# Patient Record
Sex: FEMALE | Race: WHITE | NOT HISPANIC OR LATINO
[De-identification: names, ages, dates, MRNs, and addresses within clinical notes are randomized per-mention and may not be internally consistent; named-entity substitution may affect disease eponyms.]

---

## 2017-09-10 ENCOUNTER — HEALTH MAINTENANCE LETTER (OUTPATIENT)
Age: 32
End: 2017-09-10

## 2017-09-14 ENCOUNTER — COMMUNICATION - HEALTHEAST (OUTPATIENT)
Dept: FAMILY MEDICINE | Facility: CLINIC | Age: 32
End: 2017-09-14

## 2017-09-22 ENCOUNTER — HOSPITAL ENCOUNTER (OUTPATIENT)
Dept: ULTRASOUND IMAGING | Facility: HOSPITAL | Age: 32
Discharge: HOME | End: 2017-09-22
Attending: ADVANCED PRACTICE MIDWIFE

## 2017-09-22 ENCOUNTER — PRENATAL OFFICE VISIT - HEALTHEAST (OUTPATIENT)
Dept: MIDWIFE SERVICES | Facility: CLINIC | Age: 32
End: 2017-09-22

## 2017-09-22 ENCOUNTER — AMBULATORY - HEALTHEAST (OUTPATIENT)
Dept: OBGYN | Facility: CLINIC | Age: 32
End: 2017-09-22

## 2017-09-22 DIAGNOSIS — Z86.59 HISTORY OF POSTPARTUM DEPRESSION, CURRENTLY PREGNANT: ICD-10-CM

## 2017-09-22 DIAGNOSIS — Z34.81 ENCOUNTER FOR SUPERVISION OF OTHER NORMAL PREGNANCY, FIRST TRIMESTER: ICD-10-CM

## 2017-09-22 DIAGNOSIS — O99.891 HISTORY OF POSTPARTUM DEPRESSION, CURRENTLY PREGNANT: ICD-10-CM

## 2017-09-22 ASSESSMENT — MIFFLIN-ST. JEOR: SCORE: 1317.52

## 2017-09-29 ENCOUNTER — AMBULATORY - HEALTHEAST (OUTPATIENT)
Dept: LAB | Facility: CLINIC | Age: 32
End: 2017-09-29

## 2017-09-29 DIAGNOSIS — Z34.81 ENCOUNTER FOR SUPERVISION OF OTHER NORMAL PREGNANCY, FIRST TRIMESTER: ICD-10-CM

## 2017-09-29 LAB — HIV 1+2 AB+HIV1 P24 AG SERPL QL IA: NEGATIVE

## 2017-09-30 LAB — HBV SURFACE AG SERPL QL IA: NEGATIVE

## 2017-10-02 LAB — SYPHILIS RPR SCREEN - HISTORICAL: NORMAL

## 2017-10-30 ENCOUNTER — PRENATAL OFFICE VISIT - HEALTHEAST (OUTPATIENT)
Dept: MIDWIFE SERVICES | Facility: CLINIC | Age: 32
End: 2017-10-30

## 2017-10-30 DIAGNOSIS — Z34.82 ENCOUNTER FOR SUPERVISION OF OTHER NORMAL PREGNANCY IN SECOND TRIMESTER: ICD-10-CM

## 2017-10-30 DIAGNOSIS — Z00.00 HEALTH CARE MAINTENANCE: ICD-10-CM

## 2017-10-30 ASSESSMENT — MIFFLIN-ST. JEOR: SCORE: 1345.19

## 2017-11-01 ENCOUNTER — COMMUNICATION - HEALTHEAST (OUTPATIENT)
Dept: OBGYN | Facility: CLINIC | Age: 32
End: 2017-11-01

## 2017-11-27 ENCOUNTER — HOSPITAL ENCOUNTER (OUTPATIENT)
Dept: ULTRASOUND IMAGING | Facility: HOSPITAL | Age: 32
Discharge: HOME | End: 2017-11-27
Attending: ADVANCED PRACTICE MIDWIFE

## 2017-11-27 DIAGNOSIS — Z34.82 ENCOUNTER FOR SUPERVISION OF OTHER NORMAL PREGNANCY IN SECOND TRIMESTER: ICD-10-CM

## 2017-11-28 ENCOUNTER — AMBULATORY - HEALTHEAST (OUTPATIENT)
Dept: MIDWIFE SERVICES | Facility: CLINIC | Age: 32
End: 2017-11-28

## 2017-11-30 ENCOUNTER — PRENATAL OFFICE VISIT - HEALTHEAST (OUTPATIENT)
Dept: MIDWIFE SERVICES | Facility: CLINIC | Age: 32
End: 2017-11-30

## 2017-11-30 DIAGNOSIS — Z34.82 ENCOUNTER FOR SUPERVISION OF OTHER NORMAL PREGNANCY IN SECOND TRIMESTER: ICD-10-CM

## 2017-11-30 ASSESSMENT — MIFFLIN-ST. JEOR: SCORE: 1377.85

## 2017-12-11 ENCOUNTER — AMBULATORY - HEALTHEAST (OUTPATIENT)
Dept: OBGYN | Facility: CLINIC | Age: 32
End: 2017-12-11

## 2017-12-11 ENCOUNTER — COMMUNICATION - HEALTHEAST (OUTPATIENT)
Dept: ADMINISTRATIVE | Facility: CLINIC | Age: 32
End: 2017-12-11

## 2017-12-11 DIAGNOSIS — M54.2 NECK PAIN: ICD-10-CM

## 2017-12-13 ENCOUNTER — RECORDS - HEALTHEAST (OUTPATIENT)
Dept: ADMINISTRATIVE | Facility: OTHER | Age: 32
End: 2017-12-13

## 2017-12-13 ENCOUNTER — COMMUNICATION - HEALTHEAST (OUTPATIENT)
Dept: OBGYN | Facility: CLINIC | Age: 32
End: 2017-12-13

## 2017-12-28 ENCOUNTER — PRENATAL OFFICE VISIT - HEALTHEAST (OUTPATIENT)
Dept: MIDWIFE SERVICES | Facility: CLINIC | Age: 32
End: 2017-12-28

## 2017-12-28 DIAGNOSIS — Z34.82 ENCOUNTER FOR SUPERVISION OF OTHER NORMAL PREGNANCY IN SECOND TRIMESTER: ICD-10-CM

## 2017-12-28 ASSESSMENT — MIFFLIN-ST. JEOR: SCORE: 1391

## 2018-01-18 ENCOUNTER — PRENATAL OFFICE VISIT - HEALTHEAST (OUTPATIENT)
Dept: MIDWIFE SERVICES | Facility: CLINIC | Age: 33
End: 2018-01-18

## 2018-01-18 DIAGNOSIS — R73.09 ELEVATED GLUCOSE: ICD-10-CM

## 2018-01-18 DIAGNOSIS — Z34.83 ENCOUNTER FOR SUPERVISION OF OTHER NORMAL PREGNANCY IN THIRD TRIMESTER: ICD-10-CM

## 2018-01-18 LAB
FASTING STATUS PATIENT QL REPORTED: NO
GLUCOSE 1H P 50 G GLC PO SERPL-MCNC: 139 MG/DL (ref 70–139)
HGB BLD-MCNC: 11.6 G/DL (ref 12–16)

## 2018-01-18 ASSESSMENT — MIFFLIN-ST. JEOR: SCORE: 1417.76

## 2018-01-19 LAB — SYPHILIS RPR SCREEN - HISTORICAL: NORMAL

## 2018-01-25 ENCOUNTER — AMBULATORY - HEALTHEAST (OUTPATIENT)
Dept: MIDWIFE SERVICES | Facility: CLINIC | Age: 33
End: 2018-01-25

## 2018-01-25 DIAGNOSIS — R73.09 ELEVATED GLUCOSE: ICD-10-CM

## 2018-01-26 ENCOUNTER — AMBULATORY - HEALTHEAST (OUTPATIENT)
Dept: LAB | Facility: CLINIC | Age: 33
End: 2018-01-26

## 2018-01-26 DIAGNOSIS — R73.09 ELEVATED GLUCOSE: ICD-10-CM

## 2018-01-26 LAB
FASTING STATUS PATIENT QL REPORTED: YES
GLUCOSE 1H P 100 G GLC PO SERPL-MCNC: 107 MG/DL
GLUCOSE 2H P 100 G GLC PO SERPL-MCNC: 79 MG/DL
GLUCOSE 3H P 100 G GLC PO SERPL-MCNC: 81 MG/DL
GLUCOSE P FAST SERPL-MCNC: 72 MG/DL

## 2018-02-07 ENCOUNTER — COMMUNICATION - HEALTHEAST (OUTPATIENT)
Dept: MIDWIFE SERVICES | Facility: CLINIC | Age: 33
End: 2018-02-07

## 2018-02-15 ENCOUNTER — PRENATAL OFFICE VISIT - HEALTHEAST (OUTPATIENT)
Dept: MIDWIFE SERVICES | Facility: CLINIC | Age: 33
End: 2018-02-15

## 2018-02-15 DIAGNOSIS — K21.9 GASTROESOPHAGEAL REFLUX DISEASE WITHOUT ESOPHAGITIS: ICD-10-CM

## 2018-02-15 DIAGNOSIS — R82.90 FOUL SMELLING URINE: ICD-10-CM

## 2018-02-15 DIAGNOSIS — Z34.83 ENCOUNTER FOR SUPERVISION OF OTHER NORMAL PREGNANCY IN THIRD TRIMESTER: ICD-10-CM

## 2018-02-16 ENCOUNTER — COMMUNICATION - HEALTHEAST (OUTPATIENT)
Dept: MIDWIFE SERVICES | Facility: CLINIC | Age: 33
End: 2018-02-16

## 2018-02-19 ENCOUNTER — COMMUNICATION - HEALTHEAST (OUTPATIENT)
Dept: OBGYN | Facility: CLINIC | Age: 33
End: 2018-02-19

## 2018-02-28 ENCOUNTER — PRENATAL OFFICE VISIT - HEALTHEAST (OUTPATIENT)
Dept: MIDWIFE SERVICES | Facility: CLINIC | Age: 33
End: 2018-02-28

## 2018-02-28 DIAGNOSIS — Z13.31 POSITIVE DEPRESSION SCREENING: ICD-10-CM

## 2018-02-28 DIAGNOSIS — Z34.83 ENCOUNTER FOR SUPERVISION OF OTHER NORMAL PREGNANCY IN THIRD TRIMESTER: ICD-10-CM

## 2018-02-28 ASSESSMENT — MIFFLIN-ST. JEOR: SCORE: 1449.06

## 2018-03-05 ENCOUNTER — AMBULATORY - HEALTHEAST (OUTPATIENT)
Dept: MIDWIFE SERVICES | Facility: CLINIC | Age: 33
End: 2018-03-05

## 2018-03-15 ENCOUNTER — PRENATAL OFFICE VISIT - HEALTHEAST (OUTPATIENT)
Dept: MIDWIFE SERVICES | Facility: CLINIC | Age: 33
End: 2018-03-15

## 2018-03-15 DIAGNOSIS — O99.891 HISTORY OF POSTPARTUM DEPRESSION, CURRENTLY PREGNANT: ICD-10-CM

## 2018-03-15 DIAGNOSIS — Z34.83 ENCOUNTER FOR SUPERVISION OF OTHER NORMAL PREGNANCY IN THIRD TRIMESTER: ICD-10-CM

## 2018-03-15 DIAGNOSIS — Z86.59 HISTORY OF POSTPARTUM DEPRESSION, CURRENTLY PREGNANT: ICD-10-CM

## 2018-03-15 LAB — HGB BLD-MCNC: 12.1 G/DL (ref 12–16)

## 2018-03-15 ASSESSMENT — MIFFLIN-ST. JEOR: SCORE: 1468.11

## 2018-03-17 ENCOUNTER — COMMUNICATION - HEALTHEAST (OUTPATIENT)
Dept: OBGYN | Facility: CLINIC | Age: 33
End: 2018-03-17

## 2018-03-17 LAB
ALLERGIC TO PENICILLIN: NO
GP B STREP DNA SPEC QL NAA+PROBE: NEGATIVE

## 2018-03-21 ENCOUNTER — PRENATAL OFFICE VISIT - HEALTHEAST (OUTPATIENT)
Dept: MIDWIFE SERVICES | Facility: CLINIC | Age: 33
End: 2018-03-21

## 2018-03-21 DIAGNOSIS — Z34.83 ENCOUNTER FOR SUPERVISION OF OTHER NORMAL PREGNANCY IN THIRD TRIMESTER: ICD-10-CM

## 2018-03-21 ASSESSMENT — MIFFLIN-ST. JEOR: SCORE: 1472.19

## 2018-03-28 ENCOUNTER — PRENATAL OFFICE VISIT - HEALTHEAST (OUTPATIENT)
Dept: MIDWIFE SERVICES | Facility: CLINIC | Age: 33
End: 2018-03-28

## 2018-03-28 DIAGNOSIS — Z34.83 ENCOUNTER FOR SUPERVISION OF OTHER NORMAL PREGNANCY IN THIRD TRIMESTER: ICD-10-CM

## 2018-03-28 ASSESSMENT — MIFFLIN-ST. JEOR: SCORE: 1484.44

## 2018-04-02 ENCOUNTER — PRENATAL OFFICE VISIT - HEALTHEAST (OUTPATIENT)
Dept: MIDWIFE SERVICES | Facility: CLINIC | Age: 33
End: 2018-04-02

## 2018-04-02 DIAGNOSIS — Z13.31 POSITIVE DEPRESSION SCREENING: ICD-10-CM

## 2018-04-02 DIAGNOSIS — Z34.83 ENCOUNTER FOR SUPERVISION OF OTHER NORMAL PREGNANCY IN THIRD TRIMESTER: ICD-10-CM

## 2018-04-02 ASSESSMENT — MIFFLIN-ST. JEOR: SCORE: 1488.07

## 2018-04-09 ENCOUNTER — PRENATAL OFFICE VISIT - HEALTHEAST (OUTPATIENT)
Dept: MIDWIFE SERVICES | Facility: CLINIC | Age: 33
End: 2018-04-09

## 2018-04-09 DIAGNOSIS — Z34.83 ENCOUNTER FOR SUPERVISION OF OTHER NORMAL PREGNANCY IN THIRD TRIMESTER: ICD-10-CM

## 2018-04-09 ASSESSMENT — MIFFLIN-ST. JEOR: SCORE: 1497.14

## 2018-04-16 ENCOUNTER — PRENATAL OFFICE VISIT - HEALTHEAST (OUTPATIENT)
Dept: MIDWIFE SERVICES | Facility: CLINIC | Age: 33
End: 2018-04-16

## 2018-04-16 ENCOUNTER — COMMUNICATION - HEALTHEAST (OUTPATIENT)
Dept: MIDWIFE SERVICES | Facility: CLINIC | Age: 33
End: 2018-04-16

## 2018-04-16 DIAGNOSIS — O48.0 POST-TERM PREGNANCY, 40-42 WEEKS OF GESTATION: ICD-10-CM

## 2018-04-16 ASSESSMENT — MIFFLIN-ST. JEOR: SCORE: 1490.34

## 2018-04-17 ENCOUNTER — COMMUNICATION - HEALTHEAST (OUTPATIENT)
Dept: ADMINISTRATIVE | Facility: CLINIC | Age: 33
End: 2018-04-17

## 2018-04-17 ENCOUNTER — PRENATAL OFFICE VISIT - HEALTHEAST (OUTPATIENT)
Dept: MIDWIFE SERVICES | Facility: CLINIC | Age: 33
End: 2018-04-17

## 2018-04-17 DIAGNOSIS — O48.0 POST-TERM PREGNANCY, 40-42 WEEKS OF GESTATION: ICD-10-CM

## 2018-04-17 DIAGNOSIS — Z34.83 ENCOUNTER FOR SUPERVISION OF OTHER NORMAL PREGNANCY IN THIRD TRIMESTER: ICD-10-CM

## 2018-04-17 ASSESSMENT — MIFFLIN-ST. JEOR: SCORE: 1497.14

## 2018-04-19 ENCOUNTER — HOSPITAL ENCOUNTER (OUTPATIENT)
Dept: ULTRASOUND IMAGING | Facility: HOSPITAL | Age: 33
Discharge: HOME | End: 2018-04-19
Attending: ADVANCED PRACTICE MIDWIFE

## 2018-04-19 ENCOUNTER — AMBULATORY - HEALTHEAST (OUTPATIENT)
Dept: OBGYN | Facility: CLINIC | Age: 33
End: 2018-04-19

## 2018-04-19 ENCOUNTER — PRENATAL OFFICE VISIT - HEALTHEAST (OUTPATIENT)
Dept: MIDWIFE SERVICES | Facility: CLINIC | Age: 33
End: 2018-04-19

## 2018-04-19 DIAGNOSIS — O48.0 POST-TERM PREGNANCY, 40-42 WEEKS OF GESTATION: ICD-10-CM

## 2018-04-19 DIAGNOSIS — Z34.83 ENCOUNTER FOR SUPERVISION OF OTHER NORMAL PREGNANCY IN THIRD TRIMESTER: ICD-10-CM

## 2018-04-19 DIAGNOSIS — Z13.31 POSITIVE DEPRESSION SCREENING: ICD-10-CM

## 2018-04-19 ASSESSMENT — MIFFLIN-ST. JEOR: SCORE: 1489.89

## 2018-04-20 ENCOUNTER — ANESTHESIA - HEALTHEAST (OUTPATIENT)
Dept: OBGYN | Facility: CLINIC | Age: 33
End: 2018-04-20

## 2018-04-20 ENCOUNTER — COMMUNICATION - HEALTHEAST (OUTPATIENT)
Dept: OBGYN | Facility: CLINIC | Age: 33
End: 2018-04-20

## 2018-04-21 ENCOUNTER — HOME CARE/HOSPICE - HEALTHEAST (OUTPATIENT)
Dept: HOME HEALTH SERVICES | Facility: HOME HEALTH | Age: 33
End: 2018-04-21

## 2018-04-23 ENCOUNTER — HOME CARE/HOSPICE - HEALTHEAST (OUTPATIENT)
Dept: HOME HEALTH SERVICES | Facility: HOME HEALTH | Age: 33
End: 2018-04-23

## 2018-05-10 ENCOUNTER — COMMUNICATION - HEALTHEAST (OUTPATIENT)
Dept: MIDWIFE SERVICES | Facility: CLINIC | Age: 33
End: 2018-05-10

## 2018-05-31 ENCOUNTER — OFFICE VISIT - HEALTHEAST (OUTPATIENT)
Dept: MIDWIFE SERVICES | Facility: CLINIC | Age: 33
End: 2018-05-31

## 2018-05-31 DIAGNOSIS — Z30.09 GENERAL COUNSELING FOR PRESCRIPTION OF ORAL CONTRACEPTIVES: ICD-10-CM

## 2018-05-31 ASSESSMENT — MIFFLIN-ST. JEOR: SCORE: 1437.72

## 2021-05-31 VITALS — BODY MASS INDEX: 27.25 KG/M2 | HEIGHT: 64 IN | WEIGHT: 159.6 LBS

## 2021-05-31 VITALS — BODY MASS INDEX: 29.43 KG/M2 | HEIGHT: 64 IN | WEIGHT: 172.4 LBS

## 2021-05-31 VITALS — WEIGHT: 165.5 LBS | BODY MASS INDEX: 28.25 KG/M2 | HEIGHT: 64 IN

## 2021-05-31 VITALS — WEIGHT: 143.4 LBS | BODY MASS INDEX: 24.48 KG/M2 | HEIGHT: 64 IN

## 2021-05-31 VITALS — HEIGHT: 64 IN | WEIGHT: 156.7 LBS | BODY MASS INDEX: 26.75 KG/M2

## 2021-05-31 VITALS — BODY MASS INDEX: 29.64 KG/M2 | WEIGHT: 170 LBS

## 2021-05-31 VITALS — HEIGHT: 64 IN | BODY MASS INDEX: 25.52 KG/M2 | WEIGHT: 149.5 LBS

## 2021-06-01 VITALS — HEIGHT: 64 IN | WEIGHT: 180.2 LBS | BODY MASS INDEX: 30.77 KG/M2

## 2021-06-01 VITALS — BODY MASS INDEX: 30.15 KG/M2 | HEIGHT: 64 IN | WEIGHT: 176.6 LBS

## 2021-06-01 VITALS — BODY MASS INDEX: 30.97 KG/M2 | WEIGHT: 181.4 LBS | HEIGHT: 64 IN

## 2021-06-01 VITALS — HEIGHT: 64 IN | BODY MASS INDEX: 31.24 KG/M2 | WEIGHT: 183 LBS

## 2021-06-01 VITALS — HEIGHT: 64 IN | WEIGHT: 181.5 LBS | BODY MASS INDEX: 30.99 KG/M2

## 2021-06-01 VITALS — WEIGHT: 181 LBS | BODY MASS INDEX: 30.9 KG/M2 | HEIGHT: 64 IN

## 2021-06-01 VITALS — WEIGHT: 183 LBS | BODY MASS INDEX: 31.24 KG/M2 | HEIGHT: 64 IN

## 2021-06-01 VITALS — HEIGHT: 64 IN | WEIGHT: 169.9 LBS | BODY MASS INDEX: 29.01 KG/M2

## 2021-06-01 VITALS — BODY MASS INDEX: 30.3 KG/M2 | HEIGHT: 64 IN | WEIGHT: 177.5 LBS

## 2021-06-13 NOTE — PROGRESS NOTES
Angie Finn presents alone today, declined genetic screening .Referral sent for 20 week .  Patient will self-schedule. Discussed how these results will be disseminated with patient. Patient thinks she may have felt quickening this week after eating ice cream. She states is continuing to feel nervous about miscarriage due to her history, but denies cramping, bleeding or other physical complaints. Is exercising frequently--teaching SPIN, discussed safe hydration, exercise and importance of adequate caloric intake. Reviewed lab work WDL. Discussed hx of placental retention and recommendation for IV access in labor, patient undecided but open to further discussion. Warning signs discussed. RTC 4 weeks.

## 2021-06-13 NOTE — PROGRESS NOTES
PRENATAL VISIT   FIRST OBSTETRICAL EXAM - OB    Assessment / Impression   31 yo  at 10w 4 d per certain LMP  Inaudible fetal heart rate via doppler today  History of retained placenta  History of depression and PPD      Plan:     -Due to time constraint initial labs to be drawn within one week including HIV.  Lead screening indicated and ordered to be drawn per [facility] questionnaire. Patient states she will return in early part of next week for blood draw and to leave urine sample.    -Medications: Prenatal vitamins. Encouraged a vitamin D3, [id number] IU daily and an omega 3 fatty acid supplement daily as well.     -Vitamin B6 25 mg Q three to four times daily plus a half tab of Unisom at HS.  I also recommended real ginger in the form of raw ginger grated into foods or ginger tea.    -Problem list reviewed and updated.  -Genetic screening: discussed, patient declines genetic screening  -Role of ultrasound in pregnancy discussed; dating/viability ultrasound ordered do to absent feel heart rate via doppler  -Oriented to [facility] care and philosophy;  group, on-call and contact info discussed.  -Appropriate anticipatory guidance including nutrition & supplements, weight gain recommendations, exercise, resources, lab testing & warning signs discussed.  Questions answered.   -Follow up: Return to clinic within one week for lab draw. Return to clinic in 4 to 6 weeks for follow-up  care    Total time spent with patient 45 minutes, >50% counseling, education and coordination of care.    PAULINE Neil, HEATH  2017  10:57 AM    Subjective:    Angie is a 32 y.o.  here By herself today for an initial OB appointment . Her last period was on 2017, certain LMP.  Her last period was normal.  Angie is a former [facility] patient and familiar with several midwives on the [facility] midwifery service.She is a new patient to the  midwives. This is a planned pregnancy.  She reports havign a miscarriage in  "April 2016 at approximately 10 weeks and is tearful as she talks about this today.  She desires a first trimester u/s though declines first trimester screening.  She has been taking prenatal vitamins intermittently due to nausea telated to pregnancy.She has been eating small frequent meals, Has not used other remedies for her nausea. I recommended Vitamin B6 25 mg Q three to four times daily plus a half tab of Unisom at HS.  I also recommended real elías in the form of raw elías grated into foods or elías tea.      She reports a hx of depression as a teenager and \"mild PPD\" with her 1st baby.  She is not currently in therapy or using medication.    Patient exercises 5 x a week and has been eating a well balanced, protein rich diet.  She lives at home with her  a 2 children, she reports being and feeling safe in her home and in her relationships. She reports having her other births at [facility] and having a retained placenta for greater than 1 hour following the birth of her first child in 2013.  Patient states that Dr. SUSANNA Roberson did not take her to the OR but instead \"massaged it out\".         Medical history:Depression as a teenager, \"Mild PPD\"Chickenpox as a child, Lymes disease in 2013, retained placenta for greater than 1 hour in 2013    Patient reports her last pap smear was at [facility] in 2016 and normal.          Social History   Substance Use Topics     Smoking status: Never Smoker     Smokeless tobacco: None     Alcohol use None     Current Outpatient Prescriptions   Medication Sig Dispense Refill     OMEGA-3/DHA/EPA/FISH OIL (FISH OIL-OMEGA-3 FATTY ACIDS) 300-1,000 mg capsule Take 2 g by mouth daily.       prenatal vitamin iron-folic acid 27mg-0.8mg (PRENATAL S) 27 mg iron- 800 mcg Tab tablet Take 1 tablet by mouth daily.       No current facility-administered medications for this visit.      No Known Allergies    Risk factors:  Hx of retained placenta and depression    Review of " "Systems  General:  Denies problem  Eyes: Denies problem  Ears/Nose/Throat: Denies problem  Cardiovascular: Denies problem  Respiratory:  Denies problem  Gastrointestinal:  nausea  Genitourinary: denies problems  Musculoskeletal:  Denies problem  Skin: Denies problem  Neurologic:denies problems  Psychiatric: denies problems  Endocrine: denies problems    Objective:   /68 (Patient Site: Right Arm, Patient Position: Sitting, Cuff Size: Adult Regular)  Pulse 64  Ht 5' 3.5\" (1.613 m)  Wt 143 lb 6.4 oz (65 kg)  LMP 07/06/2017 (Exact Date)  Breastfeeding? No  BMI 25 kg/m2  Physical Exam:  General Appearance: Alert, cooperative, no distress, appears stated age  Skin: Skin color, texture, turgor normal, no rashes or lesions  Throat: Lips, mucosa, and tongue normal; teeth and gums normal  HEENT: grossly normal; otoscopic and opthalmic exam not performed.   Neck: Supple, symmetrical, trachea midline, no adenopathy;  thyroid: not enlarged, symmetric, no tenderness/mass/nodules  Lungs: Clear to auscultation bilaterally, respirations unlabored  Breasts: No breast masses, tenderness, asymmetry, or nipple discharge.  Heart: Regular rate and rhythm, S1 and S2 normal, no murmur, rub, or gallop   Abdomen: Soft, non-tender, no masses, no organomegaly.  FHTs: undetectable via doppler.  Unable to palpate fundal height.   Pelvic:Deferred  Extremities: Extremities normal without varicosities or edema      Labs: To be drawn  "

## 2021-06-14 NOTE — PROGRESS NOTES
Angie calls requesting an ambulatory referral for chiropractic care.  Referral placed and clinic assistant notified to fax a referral to the patient's chiropractic clinic of choice.    PAULINE Neil,CRM

## 2021-06-14 NOTE — PROGRESS NOTES
Angie presents to the clinic today by herself.  All is well!  Total weight gain reviewed and within normal limits.  Denies abdominal pain, loss of fluid or vaginal bleeding.  Perceiving fetal movement.  Second trimester teaching completed and danger signs and symptoms reviewed.  All questions answered.  Encouraged to call or return to clinic with any questions, concerns, or as needed.

## 2021-06-15 NOTE — PROGRESS NOTES
Angie presents to the clinic today alone.  28 week labs: 1 hour GCT, RPR and hemoglobin.  Accepting of pertussis immunization and understands  benefits.  1 hour GCT result is ELEVATED at 139 mg/dL.  A fasting 3 hour GTT was scheduled.  Baby is active, and she denies regular uterine contractions, loss of fluid or vaginal bleeding.   labor precautions and danger signs and symptoms reviewed.  All questions answered.  Encouraged daily fetal movement counting and to call or return to clinic with any questions, concerns, or as needed.

## 2021-06-15 NOTE — PROGRESS NOTES
"Angie presents to the clinic today by herself.  Experienced gastrointestinal illness over last weekend's  holiday with vomiting and loose stools, chills, dizziness, lightheadedness and headache lasting about 12 hours.  This seemingly viral gastroenteritis has made its way through most of her family, her  feeling ill currently.  Appetite has returned, good oral hydration, normal interval weight gain and total weight gain thus far.  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  Baby is active, and she denies regular uterine contractions, loss of fluid or vaginal bleeding.  Reports increased vaginal discharge only without irritation/pruritus/malodor.  Baby's sex unknown but the answer within an envelope Angie cannot decide whether to open!  Second trimester teaching completed.  Plan 28 week labs when returns to clinic: 1 hour GCT, RPR and hemoglobin.   labor precautions and danger signs and symptoms reviewed.  All questions answered.  Encouraged to call or return to clinic with any questions, concerns, or as needed.  "

## 2021-06-16 NOTE — PROGRESS NOTES
Angie presents alone.  Overall, feeling well.  3 hour GTT within normal limits on 2018.  Acid reflux symptoms are no longer adequately treated with Tums.  Reviewed the possibility of using ranitidine or famotidine OTC as directed, and patient is amenable.  Comfort measures reviewed.  Third trimester proves challenging secondary to a decrease in physical activity (compared to nonpregnant state) and cold winter weather.  Baby is active, and she denies regular uterine contractions, loss of fluid or vaginal bleeding.  Excepting of circumcision; pediatric provider: [facility] or [facility] ([facility]); PP BCM: Condoms and vasectomy likely; preregistered to [facility] today; plans breast-feeding.   labor precautions and danger signs and symptoms reviewed.  All questions answered.  Encouraged daily fetal movement counting and to call or return to clinic with any questions, concerns, or as needed.

## 2021-06-16 NOTE — PROGRESS NOTES
"Angie presents to the clinic today by herself.  Birth plan completed.  Discussed pain relief measures including: Warm hydrotherapy, nitrous oxide, IV fentanyl/analgesia and epidural anesthesia.  GBS RV culture and hemoglobin obtained today.  Baby is active, and she denies regular uterine contractions, loss of fluid or vaginal bleeding.  Mood has improved over the last 2 weeks, and anhedonia has lifted.  Feels more productive with her \"list of things to do\".   labor precautions and danger signs and symptoms reviewed.  All questions answered.  Encouraged daily fetal movement counting and to call or return to clinic with any questions, concerns, or as needed.  "

## 2021-06-16 NOTE — PROGRESS NOTES
Angie is here alone today for a routine prenatal visit.  Reviewed labs from last week, GBS negative and hemoglobin within normal limits. OB history reviewed and entered into history section.  Patient denies a history of postpartum depression and states her depression symptoms usually improve once the baby has arrived. She reports normal fetal movements.  She denies signs or symptoms of active labor, SROM, bleeding.  Thinking she may go early but hopes that she at least waits until Easter as her father is out of town in [place] and she would like for him to be around when she gives birth.  Reviewed how and when to contact the on-call midwife.  Encouraged weekly visits until birth.

## 2021-06-16 NOTE — PROGRESS NOTES
Angie presents unaccompanied for her routine prenatal visit at 33 weeks 6 days. She called and spoke with a midwife last week regarding management of constipation. Is now taking Colace and prune juice and has seen an improvement in regularity of BMs. Gave handout on additional lifestyle modifications to try. Is exercising with walking 2-3 miles per day. Could improve fluid intake and gave recommendation of 100 oz/day. OTC ranitidine was discussed at last visit to treat symptoms of acid reflux. Angie has started taking ranitidine once daily and has seen an improvement in symptoms. Brought birth preferences worksheet with for review- plans to also write a more personalized birth plan and will bring to review with midwife. Prefers to avoid IV if possible. Is interested in hydrotherapy and nitrous oxide. Does not plan to take any formal CBE with this pregnancy. Discussed free resources such as [employer] and [employer] parent topic nights. Completed hospital pre-registration at today's visit. Normal active fetal movement. Denies fluid leaking, bleeding, or contractions. Completed EDPS with score of 14, No to question 10. Patient has good insight into her mood, stating she has struggled with depression with each pregnancy. She feels this is related to her decreased ability to exercise at the intensity that she desires, and has also been hard with the winter weather. Reports napping during the day because she feels depressed. States her  and friends are aware of her mood and are supportive. Discussed availability of mental health referral for therapy or to initiate medication management if desired. Reviewed she may call midwife line with any mood concerns. Reviewed GBS testing at next visit and patient is accepting of this. Plan to return to clinic in two weeks or sooner as needed.     Patient was seen with student who was present for learning.  I personally assessed, examined and made  clinical decisions reflected in the documentation. PAULINE Lane,CNM    Britany Rodriguez RN, SNM

## 2021-06-17 NOTE — ANESTHESIA PROCEDURE NOTES
Epidural Block    Patient location during procedure: OB  Time Called: 4/20/2018 2:24 PM  Reason for Block:labor epidural  Staffing:  Performing  Anesthesiologist: YANDY MORRIS  Preanesthetic Checklist  Completed: patient identified, risks, benefits, and alternatives discussed, timeout performed, consent obtained, at patient's request, airway assessed, oxygen available, suction available, emergency drugs available and hand hygiene performed  Procedure  Patient position: sitting  Prep: ChloraPrep  Patient monitoring: continuous pulse oximetry, heart rate and blood pressure  Approach: midline  Location: L3-L4  Injection technique: BEBA saline  Number of Attempts:1  Needle  Needle type: [place]   Needle gauge: 18 G     Catheter in Space: 4  Assessment  Sensory level:  No complications      Additional Notes:  Chart reviewed. Patient examined.   Procedure and its risks, including, but not limited to headache, nerve damage, bleeding, infection, back pain, low blood pressure, cardiorespiratory arrest, and block failure were discussed with patient.   Opportunity for questions given.   Informed consent obtained. She desires to proceed with this elective procedure.   Strict sterile technique was used throughout, including hand foam, Chloroprep, sterile drape, sterile gloves, mask, and hat.    Skin and subcutaneous tissue was infiltrated with 2 ml of 1% lidocaine.      Yandy Morris MD

## 2021-06-17 NOTE — PROGRESS NOTES
"Angie presents unaccompanied for her routine prenatal visit at 37w 6d. Reports her mood is \"pretty bad\" but denies other concerns. Does not feel her mood has changed significantly, stating \"I always have a hard time in my third trimester\" and that symptoms improve following delivery \"when I get my body back\". Does not have a history of using medication to manage symptoms. Denies suicidal or homicidal thoughts. She denies regular or painful uterine contractions, bleeding, or leakage of fluid. Reports normal active fetal movement. Requests cervical exam. Breast pump provided today in clinic. Follow up in one week or sooner PRN.     Britany Rodriguez, RN, SNM  "

## 2021-06-17 NOTE — ANESTHESIA PREPROCEDURE EVALUATION
Anesthesia Evaluation      Patient summary reviewed     Airway   Mallampati: II  Neck ROM: full   Pulmonary - negative ROS and normal exam    breath sounds clear to auscultation                         Cardiovascular - negative ROS and normal exam  Rhythm: regular  Rate: normal,         Neuro/Psych - negative ROS     Endo/Other    (+) pregnant     GI/Hepatic/Renal - negative ROS           Dental - normal exam                        Anesthesia Plan  Planned anesthetic: epidural    Chart reviewed. Patient examined.    Epidural procedure and its risks, including, but not limited to headache, nerve damage, bleeding, infection, back pain, low blood pressure, cardiorespiratory arrest, and block failure were discussed fully with patient.   Opportunity for questions given.   Informed consent obtained. Desires to proceed.     ASA 2     Anesthetic plan and risks discussed with: patient

## 2021-06-17 NOTE — ANESTHESIA POSTPROCEDURE EVALUATION
Patient: Angie Finn  * No procedures listed *  Anesthesia type: epidural    Patient location: Labor and Delivery  Last vitals:   Vitals:    04/20/18 1615   BP: 117/58   Pulse: 74   Resp:    Temp:    SpO2:      Post vital signs: stable  Level of consciousness: awake and responds to simple questions  Post-anesthesia pain: pain controlled  Post-anesthesia nausea and vomiting: no  Pulmonary: unassisted, return to baseline  Cardiovascular: stable and blood pressure at baseline  Hydration: adequate  Anesthetic events: no    QCDR Measures:  ASA# 11 - Marian-op Cardiac Arrest: ASA11B - Patient did NOT experience unanticipated cardiac arrest  ASA# 12 - Marian-op Mortality Rate: ASA12B - Patient did NOT die  ASA# 13 - PACU Re-Intubation Rate: ASA13B - Patient did NOT require a new airway mgmt  ASA# 10 - Composite Anes Safety: ASA10A - No serious adverse event    Additional Notes:

## 2021-06-17 NOTE — PROGRESS NOTES
"Angie Finn presents alone today. Reports had cramping/contractions last week but stopped. No vaginal bleeding. Normal fetal movement. States she would be interested in membrane sweeping Friday with CNM if no labor and aware of r/b/a- I discussed with her that based on my exam I believe membrane sweeping would be possible though may be somewhat difficult based on position and may be uncomfortable, but that CNM on Friday would evaluate. Angie states that last Monday was the worst day emotionally and feels it has been improving, has help around so she can have solo time but continues to feel frustrated by the \"when are you going to have your baby\" questions. Reports mild HA this am, resolved with hydration. Reviewed CNM on-call number and when to call including for s/s of labor, srom, decreased fetal movement, bleeding, or other warning signs.     "

## 2021-06-17 NOTE — PROGRESS NOTES
"Patient presents for membrane sweep.  I spoke with patient on the phone prior to appointment.  She is very emotional, feeling very overdue.  She had decided not to pursue membrane sweeping yesterday - somewhat worried about it hurting.  Has information from others that it \"hurt real bad.\"  Has questions about false labor with membrane sweep.  She also reflects a lot on the difficulty of others' perceptions about \"how pregnant\" she is.  She then starts to look at things online about overdue pregnancies.  We discussed normalcy of gestational age, her desire for low intervention birth, induction vs. Expectant management.  Ultimately, she desires membrane sweep today (done without difficulty and scant bloody show noted), VE:  4/70/-1/soft/posterior, will do BPP on Thursday and will call after BPP if she desires to schedule IOL.  Will likely do IOL by Saturday if BPP WNL.    FM normal.    NST today:  145 bpm, moderate variability, +accels, no decels, occasional contractions.    Reviewed CNM number and when to call.    "

## 2021-06-17 NOTE — PROGRESS NOTES
Angie Finn presents alone today, continuing to waiver about next steps and feel frustrated by lack of labor and undecided regarding induction of labor. She shares that many other women she knows have said that membrane sweeping is really painful and doesn't help induce labor, and she also states that her friends think she should just be induced and get an epidural. We reviewed that birth process, birth story and her desires may be different from what other people expect/want from a labor experience. I strongly encouraged Angie to feel comfortable/confident in the decision to have membrane swept, we reviewed the process, the possible levels of discomfort, the r/b/a and all questions were answered and after contemplation Angie does not desire. Reviewed recommendation for NST and BPP later this week, orders placed, and a follow up visit with KAYLA Harris CNM. Angie states she is having no contractions/cramping at this time, no vaginal bleeding and no leaking of fluid and reports adequate fetal movement; we reviewed what movements we anticipate at this point. No HA, vision changes or any other PIH symptoms. Warning signs reviewed, reviewed labor, PROM and other questions. RTC in 4 days for NST and BPP.

## 2021-06-17 NOTE — PROGRESS NOTES
Patient was seen with student who was present for learning.  I personally assessed, examined and made clinical decisions reflected in the documentation. Tammie Quispe, PAULINE,CNM

## 2021-06-17 NOTE — PROGRESS NOTES
"Angie Finn presents alone today. Initially seen by student but when this midwife visited with her she shares she is feeling uncomfortable with seeing students at every visit and is curious about hospital situation- she is requesting no students during her birth or at future visits. She shares today she is not doing well emotionally and hasn't felt she has been able to share this, denies any SI/HI, but is tearful throughout our visit stating that family and friends constantly asking why she hasn't given birth yet is leading to increased anxiety that she won't go into labor on her own. She shares that part of her mood changes are related to her inability to be as active as she would like, and the feeling that she does not have control over her body and things happening to her body she doesn't like-- citing constipation as a main issue. She tries to remain active at the gym but isn't able to be as physical as she desires. We discussed self-care and also discussed other ways of releasing endorphins including orgasm with or without partner and Angie states she has no sexual desire at this time. Reviewed natural methods of induction as she is struggling with idea of being pregnant for \"another two weeks\", I did offer membrane sweeping today, patient is unsure about this, based on SVE at last visit she is candidate for induction of labor at 39 weeks, Angie may return to clinic on Thursday to see KAYLA Harris to discuss further and for SVE if desires; I encouraged Angie to try and frame rest of pregnancy as a choice that she is desiring natural labor and to take this as empowering that while many things are out of our control that choosing to avoid elective induction or membrane sweeping etc is in her control. We did discuss induction minimally, discussed Pitocin induction and also reviewed scenarios under which amniotomy may be method of induction. Encouraged Angie to do one thing a day just for self-care and " encouraged to share with her spouse when he makes comments that are frustrating/hurtful in a calm way that shares the uniqueness of her experience as a pregnant person and his uniqueness as a partner to a pregnant person. Reports normal FM, denies any vaginal bleeding, no s/sx PIH, no regular contractions. Reviewed CNM on-call number and when to call including for s/s of labor, srom, decreased fetal movement, bleeding, or other warning signs. Angie is also aware that she can call the CNM on-call with any questions/concerns regarding her mood.

## 2021-06-17 NOTE — PROGRESS NOTES
Angie presents to the clinic today by herself.  BPP performed this mornin/8, KAMI 9.7 cm.  Nonstress test was actually performed YESTERDAY (please see details within previous prenatal progress note).  The patient has been contemplating the benefits and risks of induction of labor, and she is requesting induction of labor tomorrow.  Considering Triana score of 8, recommend Pitocin induction of labor.  IOL scheduled at [facility] for tomorrow, 2018 at 7 AM; the patient understands to call the unit at 6 AM to check in.  She expresses excitement that both PAULINE Marin CNM and PAULINE Landeros CNM are the midwives were scheduled there tomorrow as she knows them both!  As well, requesting sweeping of membranes today which she tolerated well and yielded a small amount of bloody show on examiner's glove.  There has been essentially no change since yesterday's exam.  Baby is active!  She denies regular uterine contractions, loss of fluid or vaginal bleeding.  Feeling Enio Swann contractions.  Sweeping of membranes yesterday because nothing more than mild cramping.  Sleeping well!  Term labor precautions and danger signs and symptoms reviewed.  All questions answered.  Encouraged daily fetal movement counting and to call or return to clinic with any questions, concerns, or as needed.

## 2021-06-18 NOTE — PROGRESS NOTES
6-week Postpartum Visit:     Assessment:   1.  6 weeks postpartum, status post normal spontaneous vaginal birth  2.  Breast-feeding  3.  Contraceptive management    Plan:   - Reviewed warning signs of postpartum depression. [place]  Mental Health Resource List given for future reference prn.   -PP exercises reviewed and encouraged modified abdominal crunches and Kegels daily. Encouraged integrating formal exercise, such as walking 20-30mns daily.   -Warning signs of breast infections of mastitis and thrush reviewed. Breastfeeding support resource list and contact info given, including [facility] and [facility].   -Encouraged to continue with PNV, Vitamin D and DHA supplements.   - Return of fertility discussed. Plans vasectomy and progesterone only contraceptive pill until surgery and semen analyses occur for contraception.   -Reviewed warning signs of pelvic pain, excessive bleeding or abdominal pain, fever/chills, or signs of breast infection.   - Rx given for Micronor 1 p.o. daily, #28, 11 refills.  Discussed changing to combined oral contraceptive pills at 6 months postpartum if desired.  -Labs today: pap, Hgb d/t hx of postpartum hemorrhage, fasting blood sugar (yearly if GDM).   -RTC for routine health maintenance or sooner as needed.     TT with patient 40 mns, >50% time spent in counseling or coordination of care.     Subjective:   Angie is a 34yo, here for her 6 week postpartum exam. She is integrating birth experience/care and transition well. Bleeding and uterine cramping have ceased. Denies pain. Reports normal bowel and bladder functioning. EPDS score 1/30. Reports good family/friend support.  Breastfeeding well-established. Feeding q two hours during the day and baby is sleeping 7 hours at night.     Has not resumed intercourse. Denies pain or concerns. Plans to use vasectomy and progesterone only contraceptive pills until surgery and semen analyses  occur for contraception.     Review of Systems  Pertinent items are noted in HPI.      Objective:     Physical Exam:  General: Pleasant, articulate, well-groomed, well-nourished female.  Not in any apparent distress.  Breasts: Symmetrical, negative nipple discharge.  Abdomen: Soft, nontender, no masses.  External genitalia: Normal hair distribution, no lesions.  Urethral opening: Without lesions, or tenderness.   Bladder: Without masses, or tenderness.  Vagina: Soft, nontender, no masses  Cervix: Closed, thick, long.  Bimanual: Finds uterus small, well involuted mobile, nontender, no masses.  Negative CMT.  Adnexa, without masses or tenderness.    Lower extremities: no significant edema.

## 2021-07-14 PROBLEM — Z34.90 PREGNANT: Status: RESOLVED | Noted: 2018-04-20 | Resolved: 2018-04-20

## 2021-07-14 PROBLEM — Z86.59 HISTORY OF POSTPARTUM DEPRESSION, CURRENTLY PREGNANT: Status: RESOLVED | Noted: 2017-09-24 | Resolved: 2018-04-20

## 2021-07-14 PROBLEM — O48.0 POST-TERM PREGNANCY, 40-42 WEEKS OF GESTATION: Status: RESOLVED | Noted: 2018-04-18 | Resolved: 2018-04-21

## 2021-07-14 PROBLEM — O99.891 HISTORY OF POSTPARTUM DEPRESSION, CURRENTLY PREGNANT: Status: RESOLVED | Noted: 2017-09-24 | Resolved: 2018-04-20

## 2021-07-14 PROBLEM — O26.03 EXCESSIVE WEIGHT GAIN DURING PREGNANCY IN THIRD TRIMESTER: Status: RESOLVED | Noted: 2018-04-20 | Resolved: 2018-04-20

## 2021-07-14 PROBLEM — Z34.80 ENCOUNTER FOR SUPERVISION OF OTHER NORMAL PREGNANCY: Status: RESOLVED | Noted: 2017-09-22 | Resolved: 2018-05-31

## 2021-07-31 ENCOUNTER — HEALTH MAINTENANCE LETTER (OUTPATIENT)
Age: 36
End: 2021-07-31

## 2021-09-25 ENCOUNTER — HEALTH MAINTENANCE LETTER (OUTPATIENT)
Age: 36
End: 2021-09-25

## 2022-08-27 ENCOUNTER — HEALTH MAINTENANCE LETTER (OUTPATIENT)
Age: 37
End: 2022-08-27

## 2023-01-07 ENCOUNTER — HEALTH MAINTENANCE LETTER (OUTPATIENT)
Age: 38
End: 2023-01-07

## 2023-04-28 ENCOUNTER — OFFICE VISIT (OUTPATIENT)
Dept: MIDWIFE SERVICES | Facility: CLINIC | Age: 38
End: 2023-04-28
Payer: COMMERCIAL

## 2023-04-28 VITALS
WEIGHT: 149 LBS | HEART RATE: 60 BPM | HEIGHT: 64 IN | SYSTOLIC BLOOD PRESSURE: 124 MMHG | BODY MASS INDEX: 25.44 KG/M2 | DIASTOLIC BLOOD PRESSURE: 80 MMHG

## 2023-04-28 DIAGNOSIS — Z00.00 ENCOUNTER FOR ROUTINE ADULT HEALTH EXAMINATION WITHOUT ABNORMAL FINDINGS: Primary | ICD-10-CM

## 2023-04-28 DIAGNOSIS — Z86.59 HISTORY OF DEPRESSION: ICD-10-CM

## 2023-04-28 PROBLEM — Z13.31 POSITIVE DEPRESSION SCREENING: Status: ACTIVE | Noted: 2018-02-28

## 2023-04-28 PROBLEM — E78.00 ELEVATED CHOLESTEROL: Status: ACTIVE | Noted: 2023-04-28

## 2023-04-28 LAB
CHOLEST SERPL-MCNC: 192 MG/DL
FASTING STATUS PATIENT QL REPORTED: NORMAL
GLUCOSE SERPL-MCNC: 84 MG/DL (ref 70–99)
HCV AB SERPL QL IA: NONREACTIVE
HDLC SERPL-MCNC: 58 MG/DL
LDLC SERPL CALC-MCNC: 110 MG/DL
NONHDLC SERPL-MCNC: 134 MG/DL
TRIGL SERPL-MCNC: 118 MG/DL
TSH SERPL DL<=0.005 MIU/L-ACNC: 1.6 UIU/ML (ref 0.3–4.2)

## 2023-04-28 PROCEDURE — 99385 PREV VISIT NEW AGE 18-39: CPT | Performed by: ADVANCED PRACTICE MIDWIFE

## 2023-04-28 PROCEDURE — 80061 LIPID PANEL: CPT | Performed by: ADVANCED PRACTICE MIDWIFE

## 2023-04-28 PROCEDURE — 87624 HPV HI-RISK TYP POOLED RSLT: CPT | Performed by: ADVANCED PRACTICE MIDWIFE

## 2023-04-28 PROCEDURE — 84443 ASSAY THYROID STIM HORMONE: CPT | Performed by: ADVANCED PRACTICE MIDWIFE

## 2023-04-28 PROCEDURE — G0145 SCR C/V CYTO,THINLAYER,RESCR: HCPCS | Performed by: ADVANCED PRACTICE MIDWIFE

## 2023-04-28 PROCEDURE — 86803 HEPATITIS C AB TEST: CPT | Performed by: ADVANCED PRACTICE MIDWIFE

## 2023-04-28 PROCEDURE — 82947 ASSAY GLUCOSE BLOOD QUANT: CPT | Performed by: ADVANCED PRACTICE MIDWIFE

## 2023-04-28 PROCEDURE — 36415 COLL VENOUS BLD VENIPUNCTURE: CPT | Performed by: ADVANCED PRACTICE MIDWIFE

## 2023-04-28 NOTE — PROGRESS NOTES
Assessment:   1.  Annual well woman exam  2.  Healthcare maintenance  3.  Contraceptive management  4.  Cervical cancer screening  5.  Anxiety symptoms  6.  (Seasonal) depression symptoms     Plan:      1. Discussed nutrition and exercise.   2. Blood tests: Hep C, lipids, glucose, TSH  3. Breast self exam technique reviewed and patient encouraged to perform self-exam monthly.   4. Contraception: partner vasectomy  5.  Pap today. HPV co-testing discussed with that pap, then paps q 5 yrs if both negative.  6.  RTC 1 year for annual physical exam, PRN  7. Plans to contact insurance to see what benefits she has for therapy. Discussed finding high quality DHA brand and starting 1,000mg fish oil daily. Also encouraged to increase D3 supplement to 2,000-4,000 international units daily. Encouraged to continue daily exercise for physical and mental health benefits.     Subjective:     Angie Finn is a 38 year old female who presents for an annual exam.     Also notes concern with seasonal depression and increased anxiety. Feels like anxiety worsens 1 day prior to period and through day 1-2, then resolves. Anxiety experienced as feeling shaky and irritable. She has been seen previously for mixed depression and anxiety and started on 10 mg esitalopram and hydroxyzine 25 mg prn. She has not been taking either consistently and hopes to avoid pharmaceutical management. Also using 600 units D3 intermittently. Exercises regularly and finds this helpful for mood. Has many mentors she feels comfortable with and is open to therapy. Has never had thyroid screen.     ANN 7=2  PHQ9=1    Healthy Habits:   Regular Exercise: Yes   Sunscreen Use: in summer  Healthy Diet: Yes  Sexually active: Yes  STI risk No  domestic violence No    Self Breast Exam Monthly:encouraged breast awareness  Colonoscopy: No  Lipid Profile: drawn today  Glucose Screen: today  Last Dexa: n/a      Immunization History   Administered Date(s) Administered     HEPA  "2000, 2003     HepB 2000, 2000, 2003     MMR 1997     Meningococcal (Menomune ) 1997, 2003     TD,PF 7+ (Tenivac) 1997     TDAP Vaccine (Adacel) 2008     Immunization status: UTD.    Gynecologic History  Patient's last menstrual period was 2023.  Contraception: vasectomy--current partner    Cancer screening:   Last Pap: 2010. Results were: NILM  Last mammogram: n/a.       OB History    Para Term  AB Living   4 3 3 0 1 3   SAB IAB Ectopic Multiple Live Births   1 0 0 0 3      # Outcome Date GA Lbr Henry/2nd Weight Sex Delivery Anes PTL Lv   4 Term 18 41w1d 01:46 / 00:06 3.43 kg (7 lb 9 oz) F Vag-Spont EPI N BHAVNA      Name: JAYSON NEW-MALU      Apgar1: 9  Apgar5: 9   3 Term 16 39w6d 07:35 / 00:22 3.317 kg (7 lb 5 oz) F Vag-Spont None N BHAVNA      Birth Comments: Spontaneous labor, unmedicated, small lac, repaired. BF 11 months.       Name: Mouna      Apgar1: 9  Apgar5: 9   2 SAB 04/01/15           1 Term 13 40w5d  3.374 kg (7 lb 7 oz) F Vag-Spont EPI N BHAVNA      Birth Comments: PROM, pitocin, retained placenta (not requiring manual removal or D&C), no PPH      Complications: Retained placenta      Name: CONSTANTINO       Current Outpatient Medications   Medication Sig Dispense Refill     cholecalciferol (VITAMIN D3) 10 mcg (400 units) TABS tablet        Past Medical History:   Diagnosis Date     Acute upper respiratory infections of unspecified site      Depression     was on meds, unsure which; also had \"rescue med\" for anxiety     Other acne      Other malaise and fatigue      Routine general medical examination at a health care facility      Undiagnosed cardiac murmurs     ECHO in college: nl     Past Surgical History:   Procedure Laterality Date     HC TOOTH EXTRACTION W/FORCEP       Patient has no known allergies.  Family History   Problem Relation Age of Onset     Colon Polyps Mother      Depression Father      Rheumatic " Heart Disease Maternal Grandmother      Colon Cancer Maternal Grandfather      Heart Disease Paternal Grandmother         due to rheumatic heart dz     Diabetes Paternal Grandfather      Social History     Socioeconomic History     Marital status:      Spouse name: Not on file     Number of children: Not on file     Years of education: Not on file     Highest education level: Not on file   Occupational History     Not on file   Tobacco Use     Smoking status: Never     Smokeless tobacco: Never   Vaping Use     Vaping status: Not on file   Substance and Sexual Activity     Alcohol use: No     Drug use: No     Sexual activity: Yes     Partners: Male     Birth control/protection: None     Comment: Monogomous   Other Topics Concern      Service No     Blood Transfusions No     Caffeine Concern No     Occupational Exposure No     Hobby Hazards No     Sleep Concern No     Stress Concern No     Weight Concern No     Special Diet No     Back Care No     Exercise Yes     Comment: 6 TIMES WEEKLY     Bike Helmet Yes     Seat Belt Yes     Comment: 100%     Self-Exams Yes   Social History Narrative    [employer],     Getting  in 7/08     Social Determinants of Health     Financial Resource Strain: Not on file   Food Insecurity: Not on file   Transportation Needs: Not on file   Physical Activity: Not on file   Stress: Not on file   Social Connections: Not on file   Intimate Partner Violence: Not on file   Housing Stability: Not on file       Review of Systems  General:  Denies problem  Eyes: Denies problem  Ears/Nose/Throat: Denies problem  Cardiovascular: Denies problem  Respiratory:  Denies problem  Gastrointestinal:  denies problems  Genitourinary: denies problems  Musculoskeletal:  Denies problem  Skin: Denies problem  Neurologic:denies problems  Psychiatric: anxiety symptoms, depression symptoms  Endocrine: denies problems        Objective:      [unfilled]  Vitals:     "04/28/23 0938   BP: 124/80   Pulse: 60   Weight: 67.6 kg (149 lb)   Height: 1.613 m (5' 3.5\")       Physical Exam:  General Appearance: Alert, cooperative, no distress, appears stated age  Skin: Skin color, texture, turgor normal, no rashes or lesions  Throat: Lips, mucosa, and tongue normal; teeth and gums normal  HEENT: grossly normal; otoscopic and opthalmic exam not performed.   Neck: Supple, symmetrical, trachea midline, no adenopathy;  thyroid: not enlarged, symmetric, no tenderness/mass/nodules  Lungs: Clear to auscultation bilaterally, respirations unlabored  Breasts: deferred  Heart: Regular rate and rhythm, S1 and S2 normal, no murmur, rub, or gallop  Abdomen: Soft, non-tender. No organomegaly or masses  Pelvic:External genitalia normal without lesions or irritation. Vagina and cervix show no lesions, inflammation, discharge or tenderness. Pap collected. No cystocele, No rectocele. Bimanual deferred.   "

## 2023-05-01 ASSESSMENT — ANXIETY QUESTIONNAIRES
6. BECOMING EASILY ANNOYED OR IRRITABLE: NOT AT ALL
3. WORRYING TOO MUCH ABOUT DIFFERENT THINGS: SEVERAL DAYS
GAD7 TOTAL SCORE: 2
2. NOT BEING ABLE TO STOP OR CONTROL WORRYING: NOT AT ALL
5. BEING SO RESTLESS THAT IT IS HARD TO SIT STILL: NOT AT ALL
1. FEELING NERVOUS, ANXIOUS, OR ON EDGE: SEVERAL DAYS
GAD7 TOTAL SCORE: 2
7. FEELING AFRAID AS IF SOMETHING AWFUL MIGHT HAPPEN: NOT AT ALL

## 2023-05-01 ASSESSMENT — PATIENT HEALTH QUESTIONNAIRE - PHQ9
SUM OF ALL RESPONSES TO PHQ QUESTIONS 1-9: 1
5. POOR APPETITE OR OVEREATING: NOT AT ALL

## 2023-05-03 LAB
BKR LAB AP GYN ADEQUACY: NORMAL
BKR LAB AP GYN INTERPRETATION: NORMAL
BKR LAB AP HPV REFLEX: NORMAL
BKR LAB AP PREVIOUS ABNORMAL: NORMAL
PATH REPORT.COMMENTS IMP SPEC: NORMAL
PATH REPORT.COMMENTS IMP SPEC: NORMAL
PATH REPORT.RELEVANT HX SPEC: NORMAL

## 2023-05-04 LAB
HUMAN PAPILLOMA VIRUS 16 DNA: NEGATIVE
HUMAN PAPILLOMA VIRUS 18 DNA: NEGATIVE
HUMAN PAPILLOMA VIRUS FINAL DIAGNOSIS: NORMAL
HUMAN PAPILLOMA VIRUS OTHER HR: NEGATIVE

## 2024-03-29 ENCOUNTER — PATIENT OUTREACH (OUTPATIENT)
Dept: CARE COORDINATION | Facility: CLINIC | Age: 39
End: 2024-03-29
Payer: COMMERCIAL

## 2024-04-12 ENCOUNTER — PATIENT OUTREACH (OUTPATIENT)
Dept: CARE COORDINATION | Facility: CLINIC | Age: 39
End: 2024-04-12
Payer: COMMERCIAL

## 2024-06-29 ENCOUNTER — HEALTH MAINTENANCE LETTER (OUTPATIENT)
Age: 39
End: 2024-06-29

## 2024-10-25 ENCOUNTER — PATIENT OUTREACH (OUTPATIENT)
Dept: CARE COORDINATION | Facility: CLINIC | Age: 39
End: 2024-10-25
Payer: COMMERCIAL

## 2025-03-30 ENCOUNTER — HEALTH MAINTENANCE LETTER (OUTPATIENT)
Age: 40
End: 2025-03-30

## 2025-07-12 ENCOUNTER — HEALTH MAINTENANCE LETTER (OUTPATIENT)
Age: 40
End: 2025-07-12

## 2025-08-08 ENCOUNTER — APPOINTMENT (OUTPATIENT)
Dept: RADIOLOGY | Facility: HOSPITAL | Age: 40
End: 2025-08-08
Attending: STUDENT IN AN ORGANIZED HEALTH CARE EDUCATION/TRAINING PROGRAM
Payer: COMMERCIAL

## 2025-08-08 PROCEDURE — 71046 X-RAY EXAM CHEST 2 VIEWS: CPT
